# Patient Record
Sex: FEMALE | Race: WHITE
[De-identification: names, ages, dates, MRNs, and addresses within clinical notes are randomized per-mention and may not be internally consistent; named-entity substitution may affect disease eponyms.]

---

## 2017-12-11 ENCOUNTER — HOSPITAL ENCOUNTER (EMERGENCY)
Dept: HOSPITAL 39 - ER | Age: 77
Discharge: HOME | End: 2017-12-11
Payer: MEDICARE

## 2017-12-11 VITALS — TEMPERATURE: 97.7 F | OXYGEN SATURATION: 95 %

## 2017-12-11 VITALS — DIASTOLIC BLOOD PRESSURE: 74 MMHG | SYSTOLIC BLOOD PRESSURE: 127 MMHG

## 2017-12-11 DIAGNOSIS — I10: ICD-10-CM

## 2017-12-11 DIAGNOSIS — K59.00: ICD-10-CM

## 2017-12-11 DIAGNOSIS — N20.2: Primary | ICD-10-CM

## 2017-12-11 PROCEDURE — 80053 COMPREHEN METABOLIC PANEL: CPT

## 2017-12-11 PROCEDURE — 87086 URINE CULTURE/COLONY COUNT: CPT

## 2017-12-11 PROCEDURE — 74000: CPT

## 2017-12-11 PROCEDURE — 36415 COLL VENOUS BLD VENIPUNCTURE: CPT

## 2017-12-11 PROCEDURE — 85025 COMPLETE CBC W/AUTO DIFF WBC: CPT

## 2017-12-11 PROCEDURE — 81001 URINALYSIS AUTO W/SCOPE: CPT

## 2017-12-11 RX ADMIN — HYDROMORPHONE HYDROCHLORIDE ONE: 2 INJECTION, SOLUTION INTRAMUSCULAR; INTRAVENOUS; SUBCUTANEOUS at 20:34

## 2017-12-11 RX ADMIN — PROMETHAZINE HYDROCHLORIDE ONE MLS/HR: 25 INJECTION INTRAMUSCULAR; INTRAVENOUS at 19:22

## 2017-12-11 RX ADMIN — HYDROMORPHONE HYDROCHLORIDE ONE MG: 2 INJECTION, SOLUTION INTRAMUSCULAR; INTRAVENOUS; SUBCUTANEOUS at 19:22

## 2017-12-11 RX ADMIN — PROMETHAZINE HYDROCHLORIDE ONE: 25 INJECTION INTRAMUSCULAR; INTRAVENOUS at 20:35

## 2017-12-11 NOTE — RAD
EXAM DESCRIPTION: 



KUB



CLINICAL HISTORY: 



llq pain suspect kidney stone 



COMPARISON: 



None.

 

FINDINGS: 



Single supine view of the abdomen was submitted. There is no

evidence of bowel obstruction.  Two calcifications project over

the left kidney, measuring up to 5 mm diameter. A single

calcification measuring 4 mm projects over the right kidney.

There is moderate stool in the colon.



IMPRESSION: Probable bilateral renal stones.



Electronically signed by:  Mark Halsted  12/11/2017 7:20 PM CST

Workstation: [id number]

## 2017-12-11 NOTE — ED.PDOC
History of Present Illness





- General


Chief Complaint: Abdominal Pain


Stated Complaint: abdominal pain


Time Seen by Provider: 12/11/17 18:41


Source: patient


Exam Limitations: no limitations





- History of Present Illness


Initial Comments: 





The patient is 77-year-old  female presenting to the emergency room 

secondary to left lower quadrant pain that has been spasmodic and episodic for 

the last 24 hours.  It has gotten worse over the last 6 hours.  She reports the 

pain feels very similar to a kidney stone that she passed a couple of years 

ago.  It is on the same side.  She is not having any fevers.  She has had 

urinary frequency and dysuria. No higher back pain. No constipation or diarrhea.


Timing/Duration: 24 hours


Severity: moderate


Improving Factors: nothing


Worsening Factors: nothing


Associated Symptoms: nausea/vomiting


Allergies/Adverse Reactions: 


Allergies





Iodine Allergy (Verified 12/11/17 18:40)


 








Home Medications: 


Ambulatory Orders





Acetaminophen-Caff-Butalbital [Fioricet] 1 ea PO Q8H PRN #21 tab 12/11/17 


Amlodipine Besylate 5 mg PO DAILY 12/11/17 


Fluticasone/Salmeterol 500/50 [Advair 500/50 Diskus] 1 mcg INH DAILY 12/11/17 


Montelukast [Singulair] 10 mg PO DAILY 12/11/17 











Review of Systems





- Review of Systems


Constitutional: States: malaise


EENTM: States: no symptoms reported


Respiratory: States: no symptoms reported


Cardiology: States: no symptoms reported


Gastrointestinal/Abdominal: States: abdominal pain, nausea, vomiting


Genitourinary: States: dysuria, frequency, pain


Musculoskeletal: States: no symptoms reported


Skin: States: no symptoms reported


Neurological: States: no symptoms reported


Endocrine: States: no symptoms reported


All other Systems: No Change from Baseline





Past Medical History (General)





- Patient Medical History


Hx Asthma: Yes


Hx Hypertension: Yes


Surgical History: no surgical history





- Vaccination History


Hx Tetanus, Diphtheria Vaccination: No


Hx Influenza Vaccination: No


Hx Pneumococcal Vaccination: No





- Social History


Hx Tobacco Use: No


Hx Alcohol Use: No


Hx Substance Use: No


Hx Substance Use Treatment: No


Hx Depression: No





- Female History


Patient is a Female of Child Bearing Age (10 -59 yrs old): No





Family Medical History





- Family History


  ** Mother


Family History: Unknown





Physical Exam





- Physical Exam


General Appearance: Alert, Comfortable, No apparent distress


Eye Exam: bilateral normal


Ears, Nose, Throat: hearing grossly normal, normal ENT inspection, normal 

pharynx


Neck: full range of motion, supple


Respiratory: lungs clear, normal breath sounds, no respiratory distress, no 

accessory muscle use


Cardiovascular/Chest: normal peripheral pulses, regular rate, rhythm, no edema


Peripheral Pulses: radial,right: 2+, radial,left: 2+, dorsalis pedis,right: 2+, 

dorsalis pedis,left: 2+


Gastrointestinal/Abdominal: non tender, soft


Rectal Exam: deferred


Back Exam: normal inspection, no CVA tenderness, no vertebral tenderness


Extremity: normal range of motion, non-tender, normal inspection, no pedal edema

, normal capillary refill


Neurologic: CNs II-XII nml as tested, alert, normal mood/affect, oriented x 3


Skin Exam: normal color


Comments: 





 Vital Signs - 24 hr











  12/11/17





  18:35


 


Temperature 97.7 F


 


Pulse Rate [ 71





pulse ox] 


 


Respiratory 20





Rate 


 


Blood Pressure 177/94





[left arm] 


 


O2 Sat by Pulse 95





Oximetry 














Progress





- Progress


Progress: 





12/11/17 20:37


the patient is a 77-year-old  female presenting to the emergency room 

secondary to pain in the left lower quadrant that is most consistent with a 

kidney stone.  The patient has received a liter of IV fluids and a dose of 

Toradol.  She is no longer having any pain at all.  She has been ambulatory.  

She is not throwing up.  The patient does have some constipation on her x-ray 

and should take a dose of milk of magnesia tomorrow.  She needs to keep herself 

well-hydrated.  She will be written for Fioricet for as needed use.  She should 

follow-up with her primary care doctor for a repeat urinalysis before the 

weekend.  Antibiotics are not warranted at this time.





- Results/Orders


Results/Orders: 





 Laboratory Tests











  12/11/17 12/11/17 12/11/17





  18:41 18:41 19:55


 


WBC  10.2  


 


RBC  4.91  


 


Hgb  15.7  


 


Hct  45.2  


 


MCV  92.1  


 


MCH  32.0 H  


 


MCHC  34.7  


 


RDW  12.9  


 


Plt Count  352  


 


MPV  7.2 L  


 


Absolute Neuts (auto)  7.50 H  


 


Absolute Lymphs (auto)  1.50  


 


Absolute Monos (auto)  0.80  


 


Absolute Eos (auto)  0.30  


 


Absolute Basos (auto)  0.10  


 


Neutrophils %  73.4  


 


Lymphocytes %  15.1 L  


 


Monocytes %  7.8  


 


Eosinophils %  3.0  


 


Basophils %  0.7  


 


Sodium   138 


 


Potassium   4.2 


 


Chloride   102 


 


Carbon Dioxide   26 


 


Anion Gap   14.2 


 


BUN   24 H 


 


Creatinine   1.09 


 


BUN/Creatinine Ratio   22.0 H 


 


Random Glucose   124 H 


 


Serum Osmolality   281.1 


 


Calcium   9.9 


 


Total Bilirubin   1.3 H 


 


AST   21 


 


ALT   22 


 


Alkaline Phosphatase   125 H 


 


Serum Total Protein   7.4 


 


Albumin   4.2 


 


Globulin   3.2 


 


Albumin/Globulin Ratio   1.3 


 


Urine Color    Yellow


 


Urine Appearance    Clear


 


Urine pH    5.5


 


Ur Specific Gravity    1.020


 


Urine Protein    Trace


 


Urine Glucose (UA)    Negative


 


Urine Ketones    Trace


 


Urine Blood    Large H


 


Urine Nitrite    Negative


 


Urine Bilirubin    Negative


 


Urine Urobilinogen    0.2


 


Ur Leukocyte Esterase    Small H


 


Urine RBC    20-30 H


 


Urine WBC    5-10 H


 


Ur Epithelial Cells    0-1


 


Urine Bacteria    1+


 


Urine Mucus    Trace








KUB shows a few small stones in the kidneys.  She does have some moderate 

constipation.





Departure





- Departure


Clinical Impression: 


 Ureterolithiasis





Disposition: Discharge to Home or Self Care


Condition: Fair


Departure Forms:  ED Discharge - Pt. Copy, Patient Portal Self Enrollment


Instructions:  Kidney Stones -- Adult


Diet: regular diet


Activity: increase activity as tolerated


Referrals: 


Pierre Sommer MD [Primary Care Provider] - 1-5 Days


Prescriptions: 


Acetaminophen-Caff-Butalbital [Fioricet] 1 ea PO Q8H PRN #21 tab


 PRN Reason: Pain


Home Medications: 


Ambulatory Orders





Acetaminophen-Caff-Butalbital [Fioricet] 1 ea PO Q8H PRN #21 tab 12/11/17 


Amlodipine Besylate 5 mg PO DAILY 12/11/17 


Fluticasone/Salmeterol 500/50 [Advair 500/50 Diskus] 1 mcg INH DAILY 12/11/17 


Montelukast [Singulair] 10 mg PO DAILY 12/11/17 








Additional Instructions: 


the patient is a 77-year-old  female presenting to the emergency room 

secondary to pain in the left lower quadrant that is most consistent with a 

kidney stone.  The patient has received a liter of IV fluids and a dose of 

Toradol.  She is no longer having any pain at all.  She has been ambulatory.  

She is not throwing up.  The patient does have some constipation on her x-ray 

and should take a dose of milk of magnesia tomorrow.  She needs to keep herself 

well-hydrated.  She will be written for Fioricet for as needed use.  She should 

follow-up with her primary care doctor for a repeat urinalysis before the 

weekend.  Antibiotics are not warranted at this time.

## 2019-04-08 ENCOUNTER — HOSPITAL ENCOUNTER (EMERGENCY)
Dept: HOSPITAL 39 - ER | Age: 79
Discharge: HOME | End: 2019-04-08
Payer: MEDICARE

## 2019-04-08 VITALS — DIASTOLIC BLOOD PRESSURE: 83 MMHG | SYSTOLIC BLOOD PRESSURE: 155 MMHG | TEMPERATURE: 98.9 F

## 2019-04-08 VITALS — OXYGEN SATURATION: 96 %

## 2019-04-08 DIAGNOSIS — N13.2: Primary | ICD-10-CM

## 2019-04-08 DIAGNOSIS — Z79.899: ICD-10-CM

## 2019-04-08 DIAGNOSIS — J45.909: ICD-10-CM

## 2019-04-08 DIAGNOSIS — Z91.041: ICD-10-CM

## 2019-04-08 DIAGNOSIS — I10: ICD-10-CM

## 2019-04-08 PROCEDURE — 87086 URINE CULTURE/COLONY COUNT: CPT

## 2019-04-08 PROCEDURE — 36415 COLL VENOUS BLD VENIPUNCTURE: CPT

## 2019-04-08 PROCEDURE — 80053 COMPREHEN METABOLIC PANEL: CPT

## 2019-04-08 PROCEDURE — 85025 COMPLETE CBC W/AUTO DIFF WBC: CPT

## 2019-04-08 PROCEDURE — 74176 CT ABD & PELVIS W/O CONTRAST: CPT

## 2019-04-08 PROCEDURE — 81001 URINALYSIS AUTO W/SCOPE: CPT

## 2019-04-08 NOTE — ED.PDOC
History of Present Illness





- General


Chief Complaint: Abdominal Pain


Stated Complaint: RLQ pain since sat, possible kidney stones


Time Seen by Provider: 04/08/19 19:56


Source: patient, family





- History of Present Illness


Initial Comments: 





Pt has had RLQ pain intermittantly x 2 days with urinary frequency.  Pt has Hx 

of kidney stones and thinks this is one.  Pt has had no past abd surgeries


Timing/Duration: changing over time


Quality: cramping, waxing/waning


Onset Location: RLQ


Radiation: none


Activites at Onset: none


Prior abdominal problems: similar symptoms


Improving Factors: movement


Worsening Factors: nothing


Associated Symptoms: abdominal pain


Allergies/Adverse Reactions: 


Allergies





Iodine Allergy (Verified 12/11/17 18:40)


   





Home Medications: 


Ambulatory Orders





Montelukast [Singulair] 10 mg PO DAILY 12/11/17 


RX: Amlodipine Besylate 5 mg PO DAILY 12/11/17 


Fluticasone/Salmeterol 500/50 [Advair Diskus] 1 puff INH BID 04/08/19 


Ondansetron Tab [Zofran Tab] 0 mg PO Q6HR PRN #15 tab 04/08/19 


RX: Tramadol HCl 50 mg PO Q4HR PRN #20 tab 04/08/19 











Review of Systems





- Review of Systems


Constitutional: Denies: chills, fever


EENTM: States: no symptoms reported


Respiratory: States: no symptoms reported


Cardiology: States: no symptoms reported


Gastrointestinal/Abdominal: States: abdominal pain, nausea.  Denies: diarrhea, 

vomiting


Genitourinary: States: frequency.  Denies: dysuria


Musculoskeletal: States: no symptoms reported


Skin: States: no symptoms reported


Neurological: States: no symptoms reported





Past Medical History (General)





- Patient Medical History


Hx Seizures: No


Hx Dementia: No


Hx Asthma: Yes


Hx of COPD: No


Hx Cardiac Disorders: No


Hx Congestive Heart Failure: No


Hx Pacemaker: No


Hx Hypertension: Yes


Hx Thyroid Disease: No


Hx Diabetes: No


Hx Gastroesophageal Reflux: No


Hx Renal Disease: No


Hx Cancer: No


Hx of HIV: No


Hx Hepatitis C: No


Hx MRSA: No


Surgical History: no surgical history





- Vaccination History


Hx Tetanus, Diphtheria Vaccination: Yes


Hx Influenza Vaccination: Yes


Hx Pneumococcal Vaccination: Yes





- Social History


Hx Tobacco Use: No


Hx Alcohol Use: No


Hx Substance Use: No


Hx Substance Use Treatment: No


Hx Depression: No





Family Medical History





- Family History


  ** Mother


Family History: Unknown





Physical Exam





- Physical Exam


General Appearance: Alert, Obvious distress


Eyes, Ears, Nose, Throat Exam: PERRL/EOMI, pharynx normal


Neck: normal inspection


Cardiovascular/Respiratory: regular rate, rhythm, no M/R/G, normal breath 

sounds, no respiratory distress


Gastrointestinal/Abdominal: normal bowel sounds, soft, tenderness - RLQ and 

suprapubic


Back Exam: normal inspection, no CVA tenderness


Extremity: normal range of motion, non-tender, normal inspection


Neurologic: alert, normal mood/affect, oriented x 3


Skin Exam: normal color, warm/dry





Progress





- Progress


Progress: 





04/08/19 21:15


Pt's pain is under good control





Departure





- Departure


Clinical Impression: 


 Ureterolithiasis





Disposition: Discharge to Home or Self Care


Departure Forms:  ED Discharge - Pt. Copy, Patient Portal Self Enrollment


Instructions:  DI for Abdominal Pain-Adult


Referrals: 


Pierre Sommer MD [Primary Care Provider] - 1-2 Weeks


Prescriptions: 


RX: Tramadol HCl 50 mg PO Q4HR PRN #20 tab


 PRN Reason: Pain


Ondansetron Tab [Zofran Tab] 0 mg PO Q6HR PRN #15 tab


 PRN Reason: Nausea


Home Medications: 


Ambulatory Orders





Montelukast [Singulair] 10 mg PO DAILY 12/11/17 


RX: Amlodipine Besylate 5 mg PO DAILY 12/11/17 


Fluticasone/Salmeterol 500/50 [Advair Diskus] 1 puff INH BID 04/08/19 


Ondansetron Tab [Zofran Tab] 0 mg PO Q6HR PRN #15 tab 04/08/19 


RX: Tramadol HCl 50 mg PO Q4HR PRN #20 tab 04/08/19

## 2019-04-08 NOTE — CT
EXAM DESCRIPTION:



 Abdoment/Pelvis w/o Contrast



CLINICAL HISTORY: 



79 years  Female  R flank pain



COMPARISON: 



None



TECHNIQUE: 



Images were obtained in axial, sagittal, and coronal planes. No

oral or intravenous contrast was administered.



This exam was performed according to our departmental

dose-optimization program which includes use of Automated

Exposure Control, adjustment of the mA and/or kV according to

patient size and/or use of iterative reconstruction technique. 



FINDINGS: 



Calculus distal right ureter with associated moderate to marked

right hydronephrosis and hydroureter. The calculus measures 6 mm

in greatest anterior posterior dimension and 9 mm in greatest

superior-inferior dimension. Multiple additional punctate

nonobstructing calcifications right kidney.



Multiple 3 mm and less nonobstructing calcifications left kidney

with associated mild left hydronephrosis. Peripelvic cysts not

excluded. Incompletely distended bladder.



Appendix not well identified however no secondary signs for

appendicitis. No bowel obstruction, perforation, or inflammation.



4.8 cm cyst anterior lateral right lobe of liver. Unremarkable

spleen, gallbladder, and adrenal glands bilaterally. Atrophic

change involving the pancreas. Moderate-sized hiatal hernia.



No dilatation abdominal aorta. No adenopathy or abnormal fluid

collections seen.



Chronic changes lower lungs bilaterally. Atelectatic change left

lung base.



No acute osseous abnormality. Moderate degenerative change L5-S1.

Meningioma anterior inferior L3 vertebral body. Nonspecific

erosive and sclerotic lesion posterior left sacrum.



IMPRESSION: 



6-9 mm calculus distal right ureter with moderate to marked right

hydronephrosis and hydroureter.



Additional multiple subcentimeter nonobstructing calcifications

kidneys bilaterally.



Electronically signed by:  Selam Rodriguez MD  4/8/2019 9:22 PM CDT

Workstation: [id number]

## 2019-04-18 ENCOUNTER — HOSPITAL ENCOUNTER (EMERGENCY)
Dept: HOSPITAL 39 - ER | Age: 79
LOS: 1 days | Discharge: HOME | End: 2019-04-19
Payer: COMMERCIAL

## 2019-04-18 VITALS — TEMPERATURE: 98.1 F

## 2019-04-18 DIAGNOSIS — I10: ICD-10-CM

## 2019-04-18 DIAGNOSIS — N13.2: Primary | ICD-10-CM

## 2019-04-18 DIAGNOSIS — J45.909: ICD-10-CM

## 2019-04-18 DIAGNOSIS — E07.9: ICD-10-CM

## 2019-04-18 PROCEDURE — 74176 CT ABD & PELVIS W/O CONTRAST: CPT

## 2019-04-18 PROCEDURE — 81001 URINALYSIS AUTO W/SCOPE: CPT

## 2019-04-18 PROCEDURE — 87086 URINE CULTURE/COLONY COUNT: CPT

## 2019-04-19 VITALS — SYSTOLIC BLOOD PRESSURE: 117 MMHG | DIASTOLIC BLOOD PRESSURE: 58 MMHG | OXYGEN SATURATION: 97 %

## 2019-04-19 NOTE — ED.PDOC
History of Present Illness





- General


Chief Complaint: GI Problem


Stated Complaint: possible kidney stone, has hx of.


Time Seen by Provider: 04/18/19 22:21


Information Source: patient, RN notes reviewed, Vital Signs reviewed


Exam Limitations: no limitations





- History of Present Illness


Initial Comments: 





c/o RLQ pain. Has had some pain as much as  a  week. Worse today. No dysuria or 

fever. Feels like a previous kidney stone.


Abdominal Pain Onset Location: RLQ


Pain Radiation: flank


Quality: moderate, sharpness, steady


Timing/Duration: 1 week


Improving Factors: nothing


Worsening Factors: nothing


Associated Symptoms: denies symptoms





Review of Systems





- Review of Systems


Constitutional: States: no symptoms reported


EENTM: States: no symptoms reported


Respiratory: States: no symptoms reported


Cardiology: States: no symptoms reported


Gastrointestinal/Abdominal: States: see HPI


Genitourinary: States: see HPI


Musculoskeletal: States: see HPI


Skin: States: no symptoms reported


Neurological: States: no symptoms reported


Hematologic/Lymphatic: States: no symptoms reported





Past Medical History (General)





- Patient Medical History


Hx Seizures: No


Hx Stroke: No


Hx Dementia: No


Hx Asthma: Yes


Hx of COPD: No


Hx Cardiac Disorders: No


Hx Congestive Heart Failure: No


Hx Pacemaker: No


Hx Hypertension: Yes


Hx Thyroid Disease: Yes


Hx Diabetes: No


Hx Gastroesophageal Reflux: No


Hx Renal Disease: No


Hx Cancer: No


Hx of HIV: No


Hx Hepatitis C: No


Hx MRSA: No


Surgical History: other





- Vaccination History


Hx Tetanus, Diphtheria Vaccination: Yes


Hx Influenza Vaccination: Yes


Hx Pneumococcal Vaccination: Yes





- Social History


Hx Tobacco Use: No


Hx Alcohol Use: No


Hx Substance Use: No


Hx Substance Use Treatment: No


Hx Depression: No





Family Medical History





- Family History


  ** Mother


Family History: Unknown





Physical Exam





- Physical Exam


General Appearance: Alert, Comfortable, No apparent distress, Other - Looks 

quite well


Eyes, Ears, Nose, Throat Exam: other - moist mucosa


Neck: full range of motion, supple


Respiratory: no respiratory distress


Cardiovascular/Chest: regular rate, rhythm


Gastrointestinal/Abdominal: soft, no organomegaly, tenderness - RLQ


Back Exam: normal inspection, no CVA tenderness, no vertebral tenderness


Extremity: normal range of motion, normal inspection


Neurologic: alert, normal mood/affect, oriented x 3


Skin Exam: normal color, warm/dry, cyanosis


Special Observations: No evidence of discomfort





Progress





- Progress


Progress: 





04/21/19 06:12


UA: few WBCs, tr LE


04/21/19 06:12


UA is somewhat concernoing but denies dysuria. I will send it for culture & 

start her on abx. She has been given strict precautions re: the dangers of UTIs 

paired with a kidney stone. She will immediately return for any fever, nausea or

worsening pain.








- Results/Orders


Results/Orders: 





UA: few WBCs & trace LE





- EKG/XRAY/CT


CT Ordered: Yes - 4 mm UVJ stone with mod hydro





Departure





- Departure


Clinical Impression: 


 Ureterolithiasis





Time of Disposition: 00:13


Disposition: Discharge to Home or Self Care


Condition: Fair


Departure Forms:  ED Discharge - Pt. Copy, Patient Portal Self Enrollment


Instructions:  Kidney Stones (DC), Urinary Tract Infection, Adult (DC)


Diet: resume usual diet


Referrals: 


Pierre Sommer MD [Primary Care Provider] - 04/19/19


Prescriptions: 


Ciprofloxacin [Cipro] 250 mg PO Q12H 3 Days #5 tablet


Home Medications: 


Ambulatory Orders





Montelukast [Singulair] 10 mg PO DAILY 12/11/17 


RX: Amlodipine Besylate 5 mg PO DAILY 12/11/17 


Fluticasone/Salmeterol 500/50 [Advair Diskus] 1 puff INH BID 04/08/19 


Ondansetron Tab [Zofran Tab] 0 mg PO Q6HR PRN #15 tab 04/08/19 


RX: Tramadol HCl 50 mg PO Q4HR PRN #20 tab 04/08/19 


Ciprofloxacin [Cipro] 250 mg PO Q12H 3 Days #5 tablet 04/19/19

## 2019-04-30 ENCOUNTER — HOSPITAL ENCOUNTER (OUTPATIENT)
Dept: HOSPITAL 39 - GMAH | Age: 79
End: 2019-04-30
Attending: FAMILY MEDICINE
Payer: COMMERCIAL

## 2019-04-30 DIAGNOSIS — I10: Primary | ICD-10-CM

## 2019-11-12 ENCOUNTER — HOSPITAL ENCOUNTER (OUTPATIENT)
Dept: HOSPITAL 39 - GMA MATASK | Age: 79
End: 2019-11-12
Attending: FAMILY MEDICINE
Payer: COMMERCIAL

## 2019-11-12 DIAGNOSIS — I10: Primary | ICD-10-CM
